# Patient Record
Sex: MALE | Race: BLACK OR AFRICAN AMERICAN | NOT HISPANIC OR LATINO | Employment: UNEMPLOYED | URBAN - METROPOLITAN AREA
[De-identification: names, ages, dates, MRNs, and addresses within clinical notes are randomized per-mention and may not be internally consistent; named-entity substitution may affect disease eponyms.]

---

## 2022-05-11 ENCOUNTER — HOSPITAL ENCOUNTER (EMERGENCY)
Facility: HOSPITAL | Age: 52
Discharge: HOME | End: 2022-05-11
Attending: EMERGENCY MEDICINE
Payer: MEDICAID

## 2022-05-11 DIAGNOSIS — F11.10 OPIOID ABUSE: ICD-10-CM

## 2022-05-11 DIAGNOSIS — R29.6 FALLS FREQUENTLY: ICD-10-CM

## 2022-05-11 DIAGNOSIS — R29.898 LEG WEAKNESS, BILATERAL: Primary | ICD-10-CM

## 2022-05-11 LAB
ALBUMIN SERPL-MCNC: 3.4 GM/DL (ref 3.5–5)
ALBUMIN/GLOB SERPL: 1.1 RATIO (ref 1.1–2)
ALP SERPL-CCNC: 85 UNIT/L (ref 40–150)
ALT SERPL-CCNC: 13 UNIT/L (ref 0–55)
AMMONIA PLAS-MSCNC: 33.1 UMOL/L (ref 18–72)
AMPHET UR QL SCN: NEGATIVE
APPEARANCE UR: CLEAR
AST SERPL-CCNC: 17 UNIT/L (ref 5–34)
BACTERIA #/AREA URNS AUTO: NORMAL /HPF
BARBITURATE SCN PRESENT UR: NEGATIVE
BASOPHILS # BLD AUTO: 0.01 X10(3)/MCL (ref 0–0.2)
BASOPHILS NFR BLD AUTO: 0.2 %
BENZODIAZ UR QL SCN: NEGATIVE
BILIRUB UR QL STRIP.AUTO: NEGATIVE MG/DL
BILIRUBIN DIRECT+TOT PNL SERPL-MCNC: 0.2 MG/DL (ref 0–0.5)
BILIRUBIN DIRECT+TOT PNL SERPL-MCNC: 0.4 MG/DL (ref 0–0.8)
BILIRUBIN DIRECT+TOT PNL SERPL-MCNC: 0.6 MG/DL
BUN SERPL-MCNC: 9.1 MG/DL (ref 8.4–25.7)
CALCIUM SERPL-MCNC: 9.5 MG/DL (ref 8.4–10.2)
CANNABINOIDS UR QL SCN: NEGATIVE
CHLORIDE SERPL-SCNC: 104 MMOL/L (ref 98–107)
CO2 SERPL-SCNC: 27 MMOL/L (ref 22–29)
COCAINE UR QL SCN: NEGATIVE
COLOR UR AUTO: COLORLESS
CREAT SERPL-MCNC: 0.75 MG/DL (ref 0.73–1.18)
EOSINOPHIL # BLD AUTO: 0.01 X10(3)/MCL (ref 0–0.9)
EOSINOPHIL NFR BLD AUTO: 0.2 %
ERYTHROCYTE [DISTWIDTH] IN BLOOD BY AUTOMATED COUNT: 11.8 % (ref 11.5–17)
ETHANOL SERPL-MCNC: <10 MG/DL
FENTANYL UR QL SCN: POSITIVE
FOLATE SERPL-MCNC: 16 NG/ML (ref 7–31.4)
GLOBULIN SER-MCNC: 3.2 GM/DL (ref 2.4–3.5)
GLUCOSE SERPL-MCNC: 95 MG/DL (ref 74–100)
GLUCOSE UR QL STRIP.AUTO: NORMAL MG/DL
HCT VFR BLD AUTO: 36.1 % (ref 42–52)
HGB BLD-MCNC: 11.8 GM/DL (ref 14–18)
HYALINE CASTS #/AREA URNS LPF: NORMAL /LPF
IMM GRANULOCYTES # BLD AUTO: 0.01 X10(3)/MCL (ref 0–0.02)
IMM GRANULOCYTES NFR BLD AUTO: 0.2 % (ref 0–0.43)
KETONES UR QL STRIP.AUTO: NEGATIVE MG/DL
LEUKOCYTE ESTERASE UR QL STRIP.AUTO: NEGATIVE UNIT/L
LYMPHOCYTES # BLD AUTO: 0.93 X10(3)/MCL (ref 0.6–4.6)
LYMPHOCYTES NFR BLD AUTO: 16.7 %
MAGNESIUM SERPL-MCNC: 1.9 MG/DL (ref 1.6–2.6)
MCH RBC QN AUTO: 31.3 PG (ref 27–31)
MCHC RBC AUTO-ENTMCNC: 32.7 MG/DL (ref 33–36)
MCV RBC AUTO: 95.8 FL (ref 80–94)
MDMA UR QL SCN: NEGATIVE
MONOCYTES # BLD AUTO: 0.36 X10(3)/MCL (ref 0.1–1.3)
MONOCYTES NFR BLD AUTO: 6.5 %
NEUTROPHILS # BLD AUTO: 4.3 X10(3)/MCL (ref 2.1–9.2)
NEUTROPHILS NFR BLD AUTO: 76.2 %
NITRITE UR QL STRIP.AUTO: NEGATIVE
NRBC BLD AUTO-RTO: 0 %
OPIATES UR QL SCN: NEGATIVE
PCP UR QL: NEGATIVE
PH UR STRIP.AUTO: 7 [PH]
PH UR: 7 [PH] (ref 3–11)
PHOSPHATE SERPL-MCNC: 2.9 MG/DL (ref 2.3–4.7)
PLATELET # BLD AUTO: 162 X10(3)/MCL (ref 130–400)
PMV BLD AUTO: 9.2 FL (ref 9.4–12.4)
POTASSIUM SERPL-SCNC: 3.7 MMOL/L (ref 3.5–5.1)
PROT SERPL-MCNC: 6.6 GM/DL (ref 6.4–8.3)
PROT UR QL STRIP.AUTO: NEGATIVE MG/DL
RBC # BLD AUTO: 3.77 X10(6)/MCL (ref 4.7–6.1)
RBC #/AREA URNS AUTO: NORMAL /HPF
RBC UR QL AUTO: NEGATIVE UNIT/L
SODIUM SERPL-SCNC: 139 MMOL/L (ref 136–145)
SP GR UR STRIP.AUTO: 1.01
SPECIFIC GRAVITY, URINE AUTO (.000) (OHS): 1.01 (ref 1–1.03)
SQUAMOUS #/AREA URNS LPF: NORMAL /HPF
T PALLIDUM AB SER QL: NONREACTIVE
T PALLIDUM AB SER QL: NORMAL
TSH SERPL-ACNC: 1.7 UIU/ML (ref 0.35–4.94)
UROBILINOGEN UR STRIP-ACNC: NORMAL MG/DL
VIT B12 SERPL-MCNC: 263 PG/ML (ref 213–816)
WBC # SPEC AUTO: 5.6 X10(3)/MCL (ref 4.5–11.5)
WBC #/AREA URNS AUTO: NORMAL /HPF

## 2022-05-11 PROCEDURE — 81001 URINALYSIS AUTO W/SCOPE: CPT | Performed by: PHYSICIAN ASSISTANT

## 2022-05-11 PROCEDURE — 80053 COMPREHEN METABOLIC PANEL: CPT | Performed by: PHYSICIAN ASSISTANT

## 2022-05-11 PROCEDURE — 82140 ASSAY OF AMMONIA: CPT | Performed by: PHYSICIAN ASSISTANT

## 2022-05-11 PROCEDURE — 36415 COLL VENOUS BLD VENIPUNCTURE: CPT | Performed by: PHYSICIAN ASSISTANT

## 2022-05-11 PROCEDURE — 83735 ASSAY OF MAGNESIUM: CPT | Performed by: PHYSICIAN ASSISTANT

## 2022-05-11 PROCEDURE — 86780 TREPONEMA PALLIDUM: CPT | Performed by: PHYSICIAN ASSISTANT

## 2022-05-11 PROCEDURE — 85025 COMPLETE CBC W/AUTO DIFF WBC: CPT | Performed by: PHYSICIAN ASSISTANT

## 2022-05-11 PROCEDURE — 82607 VITAMIN B-12: CPT | Performed by: PHYSICIAN ASSISTANT

## 2022-05-11 PROCEDURE — 82746 ASSAY OF FOLIC ACID SERUM: CPT | Performed by: PHYSICIAN ASSISTANT

## 2022-05-11 PROCEDURE — 99285 EMERGENCY DEPT VISIT HI MDM: CPT | Mod: 25

## 2022-05-11 PROCEDURE — 84443 ASSAY THYROID STIM HORMONE: CPT | Performed by: PHYSICIAN ASSISTANT

## 2022-05-11 PROCEDURE — 80307 DRUG TEST PRSMV CHEM ANLYZR: CPT | Performed by: PHYSICIAN ASSISTANT

## 2022-05-11 PROCEDURE — 84100 ASSAY OF PHOSPHORUS: CPT | Performed by: PHYSICIAN ASSISTANT

## 2022-05-11 PROCEDURE — 82077 ASSAY SPEC XCP UR&BREATH IA: CPT | Performed by: PHYSICIAN ASSISTANT

## 2022-05-11 PROCEDURE — A9577 INJ MULTIHANCE: HCPCS

## 2022-05-11 PROCEDURE — 25500020 PHARM REV CODE 255

## 2022-05-11 RX ADMIN — GADOBENATE DIMEGLUMINE 17 ML: 529 INJECTION, SOLUTION INTRAVENOUS at 02:05

## 2022-05-11 NOTE — ED NOTES
Patient presents to ED via EMS. Patient was found laying on floor in urine and feces at group home. Patient states he has a hx of schizophrenia, but has been feeling extremely weak lately. Patient is currently unable to walk, but  states patient was ambulatory last week. Asked patient when he last received a bath and patient states 4 months ago.  states this is false, he bathed within the last week. Patient made aware of plan of care and expected wait times.

## 2022-05-11 NOTE — ED PROVIDER NOTES
Encounter Date: 5/11/2022       History     Chief Complaint   Patient presents with    Extremity Weakness     Brought in via EMS. Pt c/o bilateral leg weakness and unable to ambulate. Pt found down on the floor covered in urine.      50 yo M w/ PMHx significant for schizophrenia & EtOH abuse presents to ED via EMS for several month progressive worsening of b/l LE weakness. Patient is a resident at [facility], which is an independent living facility, & they contacted EMS after patient was found on the floor covered in his own urine. Patient reports he is unable to walk w/o holding onto walls or other objects & frequently falls. However, he states today that the only reason he was covered in urine was because another resident was using the bathroom & he couldn't wait any longer. Worker here w/ patient states that he has days where he can walk ok & then days like today when he is unable to walk. Reports today that he was found crawling around room & covered in urine. Denies any recent EtOH or illicit drug use. Denies any exacerbating event which caused acute onset or worsening of symptoms. Denies low back pain, LE numbness, incontinence, saddle anesthesia, unexplained weight loss, night sweats. Patient reports normal diet, but worker w/ patient reports very poor nutrition w/ little food intake, but large amounts of coca-cola        Review of patient's allergies indicates:  No Known Allergies  Past Medical History:   Diagnosis Date    Alcohol abuse     MR (mental retardation)     Schizophrenia      History reviewed. No pertinent surgical history.  History reviewed. No pertinent family history.  Social History     Tobacco Use    Smoking status: Current Some Day Smoker    Smokeless tobacco: Never Used   Substance Use Topics    Alcohol use: No    Drug use: No     Review of Systems   Constitutional: Negative for chills, fatigue, fever and unexpected weight change.   HENT: Negative for congestion and sore throat.     Respiratory: Negative for cough, shortness of breath and wheezing.    Cardiovascular: Negative for chest pain, palpitations and leg swelling.   Gastrointestinal: Negative for diarrhea, nausea and vomiting.   Endocrine: Negative for polydipsia and polyuria.   Genitourinary: Negative for difficulty urinating, dysuria and penile discharge.   Musculoskeletal: Positive for gait problem. Negative for arthralgias, back pain, joint swelling, myalgias, neck pain and neck stiffness.   Skin: Negative for rash and wound.   Neurological: Negative for dizziness, seizures, syncope, facial asymmetry, speech difficulty, weakness, light-headedness, numbness and headaches.   Hematological: Does not bruise/bleed easily.       Physical Exam     Initial Vitals [05/11/22 1149]   BP Pulse Resp Temp SpO2   135/77 78 18 98.6 °F (37 °C) 100 %      MAP       --         Physical Exam    Constitutional: He appears well-developed and well-nourished. No distress.   HENT:   Head: Normocephalic and atraumatic.   Eyes: Conjunctivae are normal. Pupils are equal, round, and reactive to light.   Neck: Neck supple.   Normal range of motion.  Cardiovascular: Normal rate, regular rhythm, normal heart sounds and intact distal pulses.   Pulmonary/Chest: Breath sounds normal.   Abdominal: Abdomen is soft. Bowel sounds are normal.   Musculoskeletal:         General: Normal range of motion.      Cervical back: Normal range of motion and neck supple.     Neurological: He is alert and oriented to person, place, and time. No cranial nerve deficit or sensory deficit. He exhibits normal muscle tone.   Reflex Scores:       Patellar reflexes are 2+ on the right side and 2+ on the left side.  Decreased proximal & distal strength in b/l LEs   Skin: Skin is warm and dry. Capillary refill takes less than 2 seconds.        Small 1.5 cm x 1.5 cm stage I pressure wound to R buttocks   Psychiatric: He has a normal mood and affect.         ED Course   Procedures  Labs  Reviewed   COMPREHENSIVE METABOLIC PANEL - Abnormal; Notable for the following components:       Result Value    Albumin Level 3.4 (*)     All other components within normal limits   DRUG SCREEN PANEL, URINE EMERGENCY - Abnormal; Notable for the following components:    Fentanyl, Urine Positive (*)     All other components within normal limits   CBC WITH DIFFERENTIAL - Abnormal; Notable for the following components:    RBC 3.77 (*)     Hgb 11.8 (*)     Hct 36.1 (*)     MCV 95.8 (*)     MCH 31.3 (*)     MCHC 32.7 (*)     MPV 9.2 (*)     All other components within normal limits   AMMONIA - Normal   TSH - Normal   ALCOHOL,MEDICAL (ETHANOL) - Normal   MAGNESIUM - Normal   PHOSPHORUS - Normal   VITAMIN B12 - Normal   FOLATE - Normal   CBC W/ AUTO DIFFERENTIAL    Narrative:     The following orders were created for panel order CBC auto differential.  Procedure                               Abnormality         Status                     ---------                               -----------         ------                     CBC with Differential[[id number]]        Abnormal            Final result                 Please view results for these tests on the individual orders.   URINALYSIS, REFLEX TO URINE CULTURE   SYPHILIS ANTIBODY (WITH REFLEX RPR)   EXTRA TUBES    Narrative:     The following orders were created for panel order EXTRA TUBES.  Procedure                               Abnormality         Status                     ---------                               -----------         ------                     Light Blue Top Hold[[id number]]                              In process                 Pink Top Hold[[id number]]                                    In process                   Please view results for these tests on the individual orders.   LIGHT BLUE TOP HOLD   PINK TOP HOLD          Imaging Results          MRI Lumbar Spine W WO Cont (Final result)  Result time 05/11/22 15:32:25    Final result by Vahid Mcdermott,  MD (05/11/22 15:32:25)                 Impression:      Motion degraded exam.    In spite of this limitation:    1. No significant canal stenosis.  2. Mild multilevel neuroforaminal stenoses as detailed.  3. No disc herniations.      Electronically signed by: Vahid Mcdermott  Date:    05/11/2022  Time:    15:32             Narrative:    EXAMINATION:  MRI LUMBAR SPINE W WO CONTRAST    CLINICAL HISTORY:  Paraspinal mass/tumor;;    TECHNIQUE:  Multiplanar, multi-sequence MR images of the lumbar spine were obtained WITH and WITHOUT the administration of intravenous contrast.    COMPARISON:  Lumbar spine radiographs earlier the same day    FINDINGS:  Number of non-rib bearing lumbar vertebral bodies: 5.    Alignment: Normal lordosis. No scoliosis    Soft tissues: No signal abnormalities    Posterior paraspinal muscles: Well preserved. No signal abnormalities    Conus medullaris: Normal, ends at T12-L1.    Cauda equina: No masses, arachnoiditis or abnormal enhancement.    Vertebrae:    No fractures, infection or neoplasm.    Degenerative changes:    L1-L2:    Facet hypertrophy.    No significant canal or foraminal narrowing.    L2-L3:    Mild left foraminal narrowing related to symmetric disc bulge and facet hypertrophy.    No significant canal or right foraminal narrowing.    L3-L4:    Mild bilateral foraminal narrowing related to symmetric disc bulge and facet hypertrophy.    No significant canal narrowing.    L4-L5:    Mild left foraminal narrowing related to symmetric disc bulge and facet hypertrophy.    No significant canal or right foraminal narrowing.    L5-S1:    Mild bilateral foraminal narrowing related to symmetric disc bulge and facet hypertrophy.    No significant canal narrowing.                               CT Head Without Contrast (Final result)  Result time 05/11/22 13:23:24    Final result by Navi Mcclure MD (05/11/22 13:23:24)                 Impression:      No acute intracranial abnormality  identified.      Electronically signed by: Navi Mcclure  Date:    05/11/2022  Time:    13:23             Narrative:    EXAMINATION:  CT HEAD WITHOUT CONTRAST    CLINICAL HISTORY:  Ataxia, nontraumatic, stroke excluded;    TECHNIQUE:  Low dose axial images were obtained through the head.  Coronal and sagittal reformations were also performed. Contrast was not administered.    Automatic exposure control was utilized to reduce the patient's radiation dose.    DLP= 81    COMPARISON:  None.    FINDINGS:  No acute intracranial hemorrhage, edema or mass. No acute parenchymal abnormality.    There is no hydrocephalus, evidence of herniation or midline shift. The ventricles and sulci are normal.    There is normal gray white differentiation.    The osseous structures are normal.    The mastoid air cells are clear.    The auditory canals are patent bilaterally.    The globes and orbital contents are normal bilaterally.    The visualized maxillary, ethmoid and sphenoid sinuses are clear.                               X-Ray Lumbar Spine 2 Or 3 Views (Final result)  Result time 05/11/22 12:58:39    Final result by Ney Mcdonald MD (05/11/22 12:58:39)                 Impression:      No acute fracture or subluxation identified on screening radiographs of the lumbar spine.      Electronically signed by: Ney Mcdonald  Date:    05/11/2022  Time:    12:58             Narrative:    EXAMINATION:  XR LUMBAR SPINE 2 OR 3 VIEWS    TECHNIQUE:  AP, lateral, and L5-S1 spot views of the lumbar spine.    COMPARISON:  None available.    FINDINGS:  There are 5 nonrib-bearing lumbar-type vertebral bodies. Normal lumbar lordosis is maintained. Alignment is maintained. Vertebral body heights are preserved, with no definite radiographic evidence of acute fracture or compression deformity.  Mild multilevel lumbar spondylosis with otherwise maintained intervertebral disc space heights.  The posterior elements are unremarkable. The sacroiliac joints  are congruent.                                 Medications   gadobenate dimeglumine (MULTIHANCE) 529 mg/mL (0.1mmol/0.2mL) injection (17 mLs Intravenous Given 5/11/22 1445)     Medical Decision Making:   Clinical Tests:   Lab Tests: Ordered and Reviewed  Radiological Study: Ordered and Reviewed  Other:   I have discussed this case with another health care provider.       <> Summary of the Discussion: Case discussed w/ Dr. Duncan & he performed face-to-face evaluation of patient at bedside. All of Dr. Duncan' recommendations were followed  Patient's workup largely unremarkable. Patient has full sensation in b/l LEs w/ intact & symmetric patellar reflexes; no evidence of cauda equina syndrome. Lumbar XR unremarkable. Lumbar MRI shows mild, multi-level neuroforaminal stenoses, but otherwise unremarkable. No mass or other abnormality noted on head CT. Aside from LE weakness, benign neuro exam. Bloodwork reveals mild hypoalbuminemia & stable anemia, but otherwise unremarkable. UDS is positive for fentanyl; patient denies all illicit drug use. I cannot rule out drug use as possible cause of falls. Patient denies SI, HI. Case discussed w/ Dr. Duncan & he agrees patient does not meet criteria for admission based on today's workup. Spoke w/ worker from [facility] & discussed possibility of walker use & she reports patient has walker, but rarely uses it. Encouraged compliance w/ walker directly w/ patient. I have sent message to ED case manager & have requested she speak w/ [facility]  regarding possible referral for wheelchair and/or nursing home placement if patient continues to have trouble taking care of himself                      Clinical Impression:   Final diagnoses:  [R29.898] Leg weakness, bilateral (Primary)  [R29.6] Falls frequently  [F11.10] Opioid abuse          ED Disposition Condition    Discharge Stable        ED Prescriptions     None        Follow-up Information     Follow up With Specialties  Details Why Contact Info Additional Information    [facility] - Internal Medicine Internal Medicine In 1 week  [address] [place] [address] [phone] Internal Medicine Clinic Entrance #1           RADHA Manzo  05/11/22 1922

## 2022-05-12 VITALS
HEIGHT: 74 IN | TEMPERATURE: 99 F | DIASTOLIC BLOOD PRESSURE: 67 MMHG | SYSTOLIC BLOOD PRESSURE: 142 MMHG | HEART RATE: 59 BPM | BODY MASS INDEX: 21.82 KG/M2 | WEIGHT: 170 LBS | OXYGEN SATURATION: 100 % | RESPIRATION RATE: 10 BRPM

## 2022-05-12 DIAGNOSIS — Z78.9 INABILITY TO PERFORM ACTIVITIES OF DAILY LIVING: Primary | ICD-10-CM

## 2022-05-12 NOTE — DISCHARGE INSTRUCTIONS
Report to Emergency Department if symptoms return or worsen; [facility] - Medicine Clinic Within 1 to 2 days, It is important that you follow up with your primary care provider or specialist if indicated for further evaluation, workup, and treatment as necessary. The exam and treatment you received in Emergency Department was for an urgent problem and NOT INTENDED AS COMPLETE CARE. It is important that you FOLLOW UP with a doctor for ongoing care. If your symptoms become WORSE or you DO NOT IMPROVE and you are unable to reach your health care provider, you should RETURN to the Emergency Department. The Emergency Department provider has provided a PRELIMINARY INTERPRETATION of all your studies. A final interpretation may be done after you are discharged. If a change in your diagnosis or treatment is needed WE WILL CONTACT YOU. It is critical that we have a CURRENT PHONE NUMBER FOR YOU.

## 2022-05-12 NOTE — ED NOTES
Spoke w/ scoial worker Aisha Lira regarding the plan of care for the patient. She said feel free to contact her @ [phone]. She also gave the number of the person who runs the all male independent living group home where he currently resides which is Rubina @[phone]. The address to the group home is [address] [place] [address]. She states the only way he would be able to make it home is by ambulance because he cannot walk.